# Patient Record
Sex: FEMALE | Race: WHITE | NOT HISPANIC OR LATINO | Employment: OTHER | URBAN - METROPOLITAN AREA
[De-identification: names, ages, dates, MRNs, and addresses within clinical notes are randomized per-mention and may not be internally consistent; named-entity substitution may affect disease eponyms.]

---

## 2017-01-03 ENCOUNTER — TELEPHONE (OUTPATIENT)
Dept: ENDOCRINOLOGY | Age: 77
End: 2017-01-03

## 2017-01-03 NOTE — TELEPHONE ENCOUNTER
----- Message from Angie Prajapati sent at 1/3/2017 12:32 PM EST -----  Contact: CARSON WITH [facility] PHYSICAL THERAPY PHONE# [phone]  CARSON WANTS TO INFORM DR. HUTCHISON THAT HE HASN'T BEEN ABLE TO SCHEDULE AN INITIAL EVALUATION FOR PHYSICAL THERAPY WITH THIS PATIENT. HE HAS BEEN CALLING FOR OVER 1 WEEK AND SHE WON'T ANSWER OR RETURN HIS CALLS. HE HAS SPOKEN WITH THE IN HOME NURSE WHO HAS BEEN IN CONTACT WITH THE PATIENT AND SHE SAYS THAT PATIENT ISN'T WANTING THERAPY.

## 2017-01-05 ENCOUNTER — OFFICE VISIT (OUTPATIENT)
Dept: ENDOCRINOLOGY | Age: 77
End: 2017-01-05

## 2017-01-05 VITALS
DIASTOLIC BLOOD PRESSURE: 54 MMHG | SYSTOLIC BLOOD PRESSURE: 142 MMHG | OXYGEN SATURATION: 96 % | WEIGHT: 182 LBS | HEIGHT: 64 IN | HEART RATE: 55 BPM | BODY MASS INDEX: 31.07 KG/M2

## 2017-01-05 DIAGNOSIS — E78.5 HYPERLIPIDEMIA, UNSPECIFIED HYPERLIPIDEMIA TYPE: ICD-10-CM

## 2017-01-05 DIAGNOSIS — E55.9 VITAMIN D DEFICIENCY: ICD-10-CM

## 2017-01-05 DIAGNOSIS — R82.71 BACTERIURIA: ICD-10-CM

## 2017-01-05 DIAGNOSIS — I10 ESSENTIAL HYPERTENSION: Primary | ICD-10-CM

## 2017-01-05 DIAGNOSIS — E11.319 TYPE 2 DIABETES MELLITUS WITH RETINOPATHY, MACULAR EDEMA PRESENCE UNSPECIFIED, UNSPECIFIED LATERALITY, UNSPECIFIED LONG TERM INSULIN USE STATUS, UNSPECIFIED RETINOPATHY SEVERITY: ICD-10-CM

## 2017-01-05 PROCEDURE — 99214 OFFICE O/P EST MOD 30 MIN: CPT | Performed by: INTERNAL MEDICINE

## 2017-01-05 RX ORDER — AMOXICILLIN 500 MG/1
1000 CAPSULE ORAL DAILY
COMMUNITY
End: 2017-05-25

## 2017-01-05 RX ORDER — NITROFURANTOIN 25; 75 MG/1; MG/1
CAPSULE ORAL
COMMUNITY
Start: 2016-11-29 | End: 2017-01-05

## 2017-01-05 RX ORDER — OXYBUTYNIN CHLORIDE 10 MG/1
TABLET, EXTENDED RELEASE ORAL
COMMUNITY
Start: 2016-11-09 | End: 2017-01-05

## 2017-01-05 NOTE — MR AVS SNAPSHOT
"                        Janice Garcia   1/5/2017 10:00 AM   Office Visit    Dept Phone:  [phone]   Encounter #:  [id number]    Provider:  Paxton Starks MD   Department:  [facility] ENDOCRINOLOGY                Your Full Care Plan              Today's Medication Changes          These changes are accurate as of: 1/5/17 12:15 PM.  If you have any questions, ask your nurse or doctor.               Stop taking medication(s)listed here:     acetaminophen 500 MG tablet   Commonly known as:  TYLENOL   Stopped by:  Paxton Starks MD           ciprofloxacin 500 MG tablet   Commonly known as:  CIPRO   Stopped by:  Paxton Starks MD           nitrofurantoin (macrocrystal-monohydrate) 100 MG capsule   Commonly known as:  MACROBID   Stopped by:  Paxton Starks MD           oxybutynin XL 10 MG 24 hr tablet   Commonly known as:  DITROPAN-XL   Stopped by:  Paxton Starks MD           oxybutynin XL 5 MG 24 hr tablet   Commonly known as:  DITROPAN-XL   Stopped by:  Paxton Starks MD                      Your Updated Medication List          This list is accurate as of: 1/5/17 12:15 PM.  Always use your most recent med list.                ALIGN 4 MG capsule       amoxicillin 500 MG capsule   Commonly known as:  AMOXIL       aspirin 325 MG tablet       clonazePAM 0.5 MG tablet   Commonly known as:  KlonoPIN       Cranberry 400 MG capsule       glucose blood test strip   Commonly known as:  CONRADO CONTOUR NEXT TEST   TESTING BS 4 TIMES DAILY       insulin  UNIT/ML injection   Commonly known as:  NOVOLIN N   50 units at bedtime       insulin regular 100 UNIT/ML injection   Commonly known as:  humuLIN R,novoLIN R   10 units at breakfast, 10 units at lunch, and 15 units at supper       Insulin Syringe-Needle U-100 31G X 5/16\" 0.5 ML misc       lisinopril 5 MG tablet   Commonly known as:  PRINIVIL,ZESTRIL   Take 1 tablet by mouth daily.       metoprolol tartrate 25 MG tablet   Commonly " known as:  LOPRESSOR   Take 1 tablet (25 mg total) by mouth 2 (two) times a day.       nitroglycerin 0.4 MG SL tablet   Commonly known as:  NITROSTAT       sodium chloride 0.65 % nasal spray   Commonly known as:  OCEAN       venlafaxine  MG 24 hr capsule   Commonly known as:  EFFEXOR-XR       Vitamin D3 2000 UNITS capsule       vitamin E 400 UNIT capsule               We Performed the Following     Microalbumin / Creatinine Urine Ratio     Urinalysis With / Microscopic If Indicated     Urine Culture       You Were Diagnosed With        Codes Comments    Essential hypertension    -  Primary ICD-10-CM: I10  ICD-9-CM: 401.9     Type 2 diabetes mellitus with retinopathy, macular edema presence unspecified, unspecified laterality, unspecified long term insulin use status, unspecified retinopathy severity     ICD-10-CM: E11.319  ICD-9-CM: 250.50, 362.01     Hyperlipidemia, unspecified hyperlipidemia type     ICD-10-CM: E78.5  ICD-9-CM: 272.4     Vitamin D deficiency     ICD-10-CM: E55.9  ICD-9-CM: 268.9     Bacteriuria     ICD-10-CM: R82.71  ICD-9-CM: 791.9       Instructions     None    Patient Instructions History      Upcoming Appointments     Visit Type Date Time Department    OFFICE VISIT 2017 10:00 AM [facility]    OFFICE VISIT 2017  1:30 PM [facility] TANVISOLOMONE SALOME      FlxOne Signup     [facility] FlxOne allows you to send messages to your doctor, view your test results, renew your prescriptions, schedule appointments, and more. To sign up, go to StoredIQ and click on the Sign Up Now link in the New User? box. Enter your FlxOne Activation Code exactly as it appears below along with the last four digits of your Social Security Number and your Date of Birth () to complete the sign-up process. If you do not sign up before the expiration date, you must request a new code.    FlxOne Activation Code: [email or web address]  Expires: 2017 12:13 PM    If you have questions,  "you can email [email or web address] or call [phone] to talk to our MyChart staff. Remember, Pinteresthart is NOT to be used for urgent needs. For medical emergencies, dial 911.               Other Info from Your Visit           Your Appointments     May 25, 2017  1:30 PM EDT   Office Visit with Paxton Starks MD   [facility] ENDOCRINOLOGY (--)    [address] [address] [phone]           Arrive 15 minutes prior to appointment.              Allergies     Atorvastatin      Dilaudid [Hydromorphone Hcl]  Other (See Comments)    Shallow breathing     Hydromorphone  Other (See Comments)    Altered mental status    Lovastatin      Morphine And Related      Penicillins      Pravastatin      Sulfa Antibiotics      Zocor  [Simvastatin]        Reason for Visit     Diabetes     Hyperlipidemia     Vitamin D Deficiency           Vital Signs     Blood Pressure Pulse Height Weight Oxygen Saturation Body Mass Index    142/54 (BP Location: Right arm, Patient Position: Sitting, Cuff Size: Large Adult) 55 63.5\" (161.3 cm) 182 lb (82.6 kg) 96% 31.73 kg/m2    Smoking Status                   Never Smoker           Problems and Diagnoses Noted     High blood pressure    High cholesterol or triglycerides    Type 2 diabetes mellitus with retinopathy    Vitamin D deficiency    Bacteria in urine            "

## 2017-01-05 NOTE — PROGRESS NOTES
Subjective   Janice Garcia is a 76 y.o. female.     HPI Comments: DM 2 NOT TESTING BS MACHINE IS BROKEN USUALLY TEST 2 X DAY / LAST DM EYE EXAM JAN 2016/ LAST DM FOOT EXAM TODAY WITH DR HUTCHISON     Diabetes   Hypoglycemia symptoms include confusion ( per daughter ), dizziness and tremors. Associated symptoms include fatigue.   Hyperlipidemia   Associated symptoms include shortness of breath.      Patient is a 76-year-old female came in for follow-up. She was admitted at [facility] 2016 for UTI and sepsis and has completed antibiotics therapy. She has intermittent incontinence. She denies any dysuria or fever.  He had a normal cystoscopy in July 2016 done by Dr. Larose.  She was admitted to the hospital in December 2016 for urinary tract infection.  She will be on chronic amoxicillin 500 mg once a day.  She was taken off oxybutynin.  She denies dysuria     She has insulin-requiring type 2 diabetes mellitus. She has been on Novolin N 50 q hs and is supposed to Humulin R 10-15-20.  She takes Novolin R 20 units at bedtime only.  She has few hypoglycemic episodes.  She has not been checking her blood sugar because she lost her meter.  Hemoglobin A1c done in December 2016 was 8.2%.  Last meal was at 10 AM.     She has retinopathy and had previous retinal laser treatment.  Her last eye examination was in May 2016.  She did not require laser treatment. She has no microalbuminuria urine sample taken last 3/16. She developed acute renal failure after the bypass surgery but did did not require hemodialysis. She was seen by Dr. Hawkins to that time. She is back on lisinopril 5 mg/day.     She has hyperlipidemia and stopped Zetia in the past because of the costs. She was intolerant of Lipitor, Mevacor, Pravachol, and Zocor in the past. She has stopped niacin. She has been off Zetia and has not started in the PCSK9 inhibitor study with Dr. Pearson      She had a sleep study in July 2016 but is supposed to use a CPAP.  She  "has not followed up with Dr. Jiang.  She does not want to use a CPAP.     She has history of depression and is on Klonopin through her psychiatrist Dr. Trevino.  Her Effexor XR dose was reduced to 75 mg once a day because of hyponatremia.     She has vitamin D deficiency and has been taking vitamin D3 1000 units regularly.    She has chronic back pain and left knee pain due to arthritis.  She had a previous right knee replacement done by Dr. Nelson.    She is overdue for a colonoscopy.  She has not followed up with Dr. Nelson.  She has intermittent constipation.  She denies any melena or hematochezia.    She is going to an assisted living facility  The following portions of the patient's history were reviewed and updated as appropriate: allergies, current medications, past family history, past medical history, past social history, past surgical history and problem list.    Review of Systems   Constitutional: Positive for chills and fatigue.   HENT: Negative.    Eyes: Negative.    Respiratory: Positive for shortness of breath.    Cardiovascular: Negative.    Gastrointestinal: Positive for abdominal distention and constipation.   Endocrine: Negative.    Musculoskeletal: Positive for back pain.   Skin: Negative.    Allergic/Immunologic: Negative.    Neurological: Positive for dizziness and tremors.   Hematological: Bruises/bleeds easily (on 325 aspirin ).   Psychiatric/Behavioral: Positive for confusion ( per daughter ) and sleep disturbance ( sleep apnea no machine ).       Objective      Vitals:    01/05/17 1108   BP: 142/54   BP Location: Right arm   Patient Position: Sitting   Cuff Size: Large Adult   Pulse: 55   SpO2: 96%   Weight: 182 lb (82.6 kg)   Height: 63.5\" (161.3 cm)     Physical Exam   Constitutional: She is oriented to person, place, and time. She appears well-developed and well-nourished. No distress.   HENT:   Head: Normocephalic.   Nose: Nose normal.   Mouth/Throat: No oropharyngeal exudate.   Eyes: " Conjunctivae and EOM are normal. Right eye exhibits no discharge. Left eye exhibits no discharge. No scleral icterus.   Neck: Neck supple. No JVD present. No tracheal deviation present. No thyromegaly present.   Cardiovascular: Normal rate, regular rhythm, normal heart sounds and intact distal pulses.  Exam reveals no gallop and no friction rub.    No murmur heard.  Pulmonary/Chest: Effort normal and breath sounds normal. No respiratory distress. She has no wheezes. She has no rales.   Abdominal: Soft. Bowel sounds are normal. She exhibits no distension and no mass. There is no tenderness.   Musculoskeletal: Normal range of motion. She exhibits no edema, tenderness or deformity.   Lymphadenopathy:     She has no cervical adenopathy.   Neurological: She is oriented to person, place, and time. She has normal reflexes. She displays normal reflexes.   Intact light touch   Skin: Skin is warm and dry. No erythema. No pallor.   Psychiatric: She has a normal mood and affect. Her behavior is normal.     Office Visit on 03/08/2016   Component Date Value Ref Range Status   • Glucose 03/08/2016 95  65 - 99 mg/dL Final    Comment: The MDRD GFR formula is only valid for adults with stable  renal function between ages 18 and 70.     • BUN 03/08/2016 18  8 - 23 mg/dL Final   • Creatinine 03/08/2016 1.06* 0.57 - 1.00 mg/dL Final   • eGFR Non African Am 03/08/2016 51* >60 mL/min/1.73 Final   • eGFR African Am 03/08/2016 61  >60 mL/min/1.73 Final   • BUN/Creatinine Ratio 03/08/2016 17.0  7.0 - 25.0 Final   • Sodium 03/08/2016 139  136 - 145 mmol/L Final   • Potassium 03/08/2016 5.4* 3.5 - 5.2 mmol/L Final   • Chloride 03/08/2016 98* 99 - 107 mmol/L Final   • Total CO2 03/08/2016 27.3  22.0 - 29.0 mmol/L Final   • Calcium 03/08/2016 10.3  8.6 - 10.5 mg/dL Final   • Total Protein 03/08/2016 7.3  6.0 - 8.5 g/dL Final   • Albumin 03/08/2016 4.20  3.50 - 5.20 g/dL Final   • Globulin 03/08/2016 3.1  gm/dL Final   • A/G Ratio 03/08/2016 1.4   g/dL Final   • Total Bilirubin 03/08/2016 0.2  0.1 - 1.2 mg/dL Final   • Alkaline Phosphatase 03/08/2016 82  39 - 117 U/L Final   • AST (SGOT) 03/08/2016 23  1 - 32 U/L Final   • ALT (SGPT) 03/08/2016 19  1 - 33 U/L Final   • Total Cholesterol 03/08/2016 179  0 - 200 mg/dL Final   • Triglycerides 03/08/2016 79  0 - 150 mg/dL Final   • HDL Cholesterol 03/08/2016 49  40 - 60 mg/dL Final   • VLDL Cholesterol 03/08/2016 15.8  5 - 40 mg/dL Final   • LDL Cholesterol  03/08/2016 114  mg/dL Final   • Hemoglobin A1C 03/08/2016 7.5* 4.8 - 5.6 % Final    Comment: Hemoglobin A1C Ranges:  Increased Risk for Diabetes  5.7% to 6.4%  Diabetes                     >= 6.5%  Diabetic Goal                < 7.0%     • TSH 03/08/2016 2.700  0.270 - 4.200 mIU/mL Final   • Free T4 03/08/2016 1.25  0.93 - 1.70 ng/dL Final   • Creatinine, Urine 03/08/2016 84.4  15.0 - 278.0 mg/dL Final   • Microalbumin, Urine 03/08/2016 24.5* 0.0 - 17.0 ug/mL Final   • Microalbumin/Creatinine Ratio Urine 03/08/2016 29.0  0.0 - 30.0 mg/g creat Final   • Specific Gravity, UA 03/08/2016 1.016  1.005 - 1.030 Final   • pH, UA 03/08/2016 6.5  5.0 - 8.0 Final   • Color, UA 03/08/2016 Yellow   Final    REFERENCE RANGE: Yellow, Straw   • Appearance, UA 03/08/2016 Clear  Clear Final   • Leukocytes, UA 03/08/2016 See below:* Negative Final    Small (1+)   • Protein 03/08/2016 Comment  Negative Final    Negative   • Glucose, UA 03/08/2016 Comment  Negative Final    Negative   • Ketones 03/08/2016 Comment  Negative Final    Negative   • Blood, UA 03/08/2016 Comment  Negative Final    Negative   • Bilirubin, UA 03/08/2016 Comment  Negative Final    Negative   • Urobilinogen, UA 03/08/2016 Comment   Final    Comment: 0.2 E.U./dL  REFERENCE RANGE: 0.2 E.U./dL, 1.0 E.U./dL     • Nitrite, UA 03/08/2016 Comment  Negative Final    Negative   • WBC 03/08/2016 3-5* None Seen /hpf Final   • RBC, UA 03/08/2016 0-2* None Seen /hpf Final   • Epithelial Cells (non renal) 03/08/2016  3-6* /hpf Final    REFERENCE RANGE: None Seen, 0-2   • Cast Type 03/08/2016 Comment   Final    HYALINE CASTS, UA            0-2              /LPF       None Seen  N   • Bacteria, UA 03/08/2016 Comment  None Seen /hpf Final    None Seen     Assessment/Plan   Janice was seen today for diabetes, hyperlipidemia and vitamin d deficiency.    Diagnoses and all orders for this visit:    Essential hypertension    Type 2 diabetes mellitus with retinopathy, macular edema presence unspecified, unspecified laterality, unspecified long term insulin use status, unspecified retinopathy severity    Hyperlipidemia, unspecified hyperlipidemia type    Vitamin D deficiency      Continue Novolin N 50 units at bedtime.  Take Novolin R regularly with meals.  Check blood sugars regularly.  Follow-up with Dr. Pearson for possible inclusion in lipid study.  Follow-up with Dr. Jiang.  Continue vitamin D supplements  Follow-up with Dr. Nelson for possible colonoscopy.  Follow with Dr. Larose as scheduled.    Send copy of my note and labs to Dr. Ramsey Nelson, Dr. Jiang, Dr. Larose, and Dr. Joe Pearson.    RTC 4 mos.

## 2017-01-06 LAB — UNABLE TO VOID: NORMAL

## 2017-01-09 ENCOUNTER — TELEPHONE (OUTPATIENT)
Dept: ENDOCRINOLOGY | Age: 77
End: 2017-01-09

## 2017-01-09 NOTE — TELEPHONE ENCOUNTER
----- Message from Arabella Rodriguez sent at 1/9/2017  1:15 PM EST -----  Contact: [facility] needs a medication list for patient sent to Fax number [phone] Hollie Roth

## 2017-01-12 ENCOUNTER — TELEPHONE (OUTPATIENT)
Dept: ENDOCRINOLOGY | Age: 77
End: 2017-01-12

## 2017-01-12 NOTE — TELEPHONE ENCOUNTER
----- Message from Angie Prajapati sent at 1/12/2017  9:08 AM EST -----  Contact: NESHA OT WITH [facility] [phone]  NESHA SAYS THAT THIS PATIENT IS REFUSING OT AND SHE WOULD LIKE DR. HUTCHISON TO KNOW THIS.

## 2017-01-18 RX ORDER — LISINOPRIL 5 MG/1
TABLET ORAL
Qty: 30 TABLET | Refills: 5 | Status: SHIPPED | OUTPATIENT
Start: 2017-01-18 | End: 2017-08-16 | Stop reason: SDUPTHER

## 2017-02-08 ENCOUNTER — TELEPHONE (OUTPATIENT)
Dept: ENDOCRINOLOGY | Age: 77
End: 2017-02-08

## 2017-02-08 DIAGNOSIS — E78.49 OTHER HYPERLIPIDEMIA: ICD-10-CM

## 2017-02-08 DIAGNOSIS — E55.9 VITAMIN D DEFICIENCY: ICD-10-CM

## 2017-02-08 DIAGNOSIS — I10 ESSENTIAL HYPERTENSION: ICD-10-CM

## 2017-02-08 DIAGNOSIS — G47.39 OTHER SLEEP APNEA: ICD-10-CM

## 2017-02-08 DIAGNOSIS — I25.10 CAD IN NATIVE ARTERY: ICD-10-CM

## 2017-02-08 RX ORDER — BLOOD SUGAR DIAGNOSTIC
STRIP MISCELLANEOUS
Qty: 200 EACH | Refills: 5 | Status: SHIPPED | OUTPATIENT
Start: 2017-02-08

## 2017-02-08 NOTE — TELEPHONE ENCOUNTER
----- Message from Michelle Patrick sent at 2/8/2017 11:04 AM EST -----  Contact: PATIENT  PATIENT REQUESTED SCRIPT  FOR BD INSULIN SYRINGES, 100CC. 5XDAY,  -[id number], REQUESTED 1 BOX.   SAID ITS BEEN OVER A YEAR SINCE SHE HAD A REFILL.   SHE SAID SHE TAKES R, 4XDAY, AND N 1XNIGHT.

## 2017-02-16 ENCOUNTER — TELEPHONE (OUTPATIENT)
Dept: ENDOCRINOLOGY | Age: 77
End: 2017-02-16

## 2017-02-17 RX ORDER — INSULIN HUMAN 100 [IU]/ML
INJECTION, SUSPENSION SUBCUTANEOUS
Qty: 30 ML | Refills: 1 | Status: SHIPPED | OUTPATIENT
Start: 2017-02-17 | End: 2017-05-25 | Stop reason: ALTCHOICE

## 2017-04-21 NOTE — TELEPHONE ENCOUNTER
----- Message from Angie Qiu sent at 4/21/2017  2:36 PM EDT -----  Contact: PHARMACY  THE PATIENT IS ASKING FOR A REFILL OF CONRADO CONTOUR NEXT TEST STRIPS. SHE TESTS 4X DAILY AND GETS 200 AT A TIME. SHE IS TOTALLY OUT AND HAS INFORMED HER PHARMACY THAT THIS IS URGENT THAT SHE GET THESE. PLEASE SEND A SCRIPT TO:  [facility]   [phone] (Phone)  [phone] (Fax)

## 2017-05-25 ENCOUNTER — OFFICE VISIT (OUTPATIENT)
Dept: ENDOCRINOLOGY | Age: 77
End: 2017-05-25

## 2017-05-25 VITALS
HEART RATE: 63 BPM | SYSTOLIC BLOOD PRESSURE: 136 MMHG | WEIGHT: 185 LBS | BODY MASS INDEX: 31.58 KG/M2 | DIASTOLIC BLOOD PRESSURE: 62 MMHG | HEIGHT: 64 IN | OXYGEN SATURATION: 97 %

## 2017-05-25 DIAGNOSIS — I10 ESSENTIAL HYPERTENSION: ICD-10-CM

## 2017-05-25 DIAGNOSIS — E11.8 TYPE 2 DIABETES MELLITUS WITH COMPLICATION, WITH LONG-TERM CURRENT USE OF INSULIN (HCC): ICD-10-CM

## 2017-05-25 DIAGNOSIS — Z79.4 TYPE 2 DIABETES MELLITUS WITH COMPLICATION, WITH LONG-TERM CURRENT USE OF INSULIN (HCC): ICD-10-CM

## 2017-05-25 DIAGNOSIS — R82.90 ABNORMAL FINDING IN URINE: ICD-10-CM

## 2017-05-25 DIAGNOSIS — E55.9 VITAMIN D DEFICIENCY: ICD-10-CM

## 2017-05-25 DIAGNOSIS — E78.5 HYPERLIPIDEMIA, UNSPECIFIED HYPERLIPIDEMIA TYPE: ICD-10-CM

## 2017-05-25 DIAGNOSIS — I25.10 CAD IN NATIVE ARTERY: ICD-10-CM

## 2017-05-25 DIAGNOSIS — Z87.440 HISTORY OF RECURRENT UTIS: ICD-10-CM

## 2017-05-25 DIAGNOSIS — E11.319 TYPE 2 DIABETES MELLITUS WITH RETINOPATHY, MACULAR EDEMA PRESENCE UNSPECIFIED, UNSPECIFIED LATERALITY, UNSPECIFIED LONG TERM INSULIN USE STATUS, UNSPECIFIED RETINOPATHY SEVERITY: Primary | ICD-10-CM

## 2017-05-25 PROCEDURE — 99214 OFFICE O/P EST MOD 30 MIN: CPT | Performed by: INTERNAL MEDICINE

## 2017-05-25 RX ORDER — MULTIVIT-MIN/IRON/FOLIC ACID/K 18-600-40
1 CAPSULE ORAL
COMMUNITY
End: 2018-03-20

## 2017-05-30 LAB
ALBUMIN SERPL-MCNC: 4.3 G/DL (ref 3.5–5.2)
ALBUMIN/CREAT UR: 11.4 MG/G CREAT (ref 0–30)
ALBUMIN/GLOB SERPL: 1.3 G/DL
ALP SERPL-CCNC: 103 U/L (ref 39–117)
ALT SERPL-CCNC: 20 U/L (ref 1–33)
APPEARANCE UR: (no result)
AST SERPL-CCNC: 27 U/L (ref 1–32)
BACTERIA #/AREA URNS HPF: ABNORMAL /HPF
BACTERIA UR CULT: ABNORMAL
BACTERIA UR CULT: ABNORMAL
BILIRUB SERPL-MCNC: 0.4 MG/DL (ref 0.1–1.2)
BILIRUB UR QL STRIP: NEGATIVE
BUN SERPL-MCNC: 16 MG/DL (ref 8–23)
BUN/CREAT SERPL: 15 (ref 7–25)
CALCIUM SERPL-MCNC: 10.6 MG/DL (ref 8.6–10.5)
CASTS URNS MICRO: ABNORMAL
CHLORIDE SERPL-SCNC: 100 MMOL/L (ref 98–107)
CHOLEST SERPL-MCNC: 215 MG/DL (ref 0–200)
CO2 SERPL-SCNC: 24.8 MMOL/L (ref 22–29)
COLOR UR: YELLOW
CREAT SERPL-MCNC: 1.07 MG/DL (ref 0.57–1)
CREAT UR-MCNC: 102.5 MG/DL
EPI CELLS #/AREA URNS HPF: ABNORMAL /HPF
GLOBULIN SER CALC-MCNC: 3.3 GM/DL
GLUCOSE SERPL-MCNC: 93 MG/DL (ref 65–99)
GLUCOSE UR QL: NEGATIVE
HBA1C MFR BLD: 7.32 % (ref 4.8–5.6)
HDLC SERPL-MCNC: 56 MG/DL (ref 40–60)
HGB UR QL STRIP: (no result)
KETONES UR QL STRIP: NEGATIVE
LDLC SERPL CALC-MCNC: 141 MG/DL (ref 0–100)
LEUKOCYTE ESTERASE UR QL STRIP: (no result)
MICROALBUMIN UR-MCNC: 11.7 UG/ML
NITRITE UR QL STRIP: POSITIVE
OTHER ANTIBIOTIC SUSC ISLT: ABNORMAL
PH UR STRIP: 6 [PH] (ref 5–8)
POTASSIUM SERPL-SCNC: 4.7 MMOL/L (ref 3.5–5.2)
PROT SERPL-MCNC: 7.6 G/DL (ref 6–8.5)
PROT UR QL STRIP: NEGATIVE
RBC #/AREA URNS HPF: ABNORMAL /HPF
SODIUM SERPL-SCNC: 139 MMOL/L (ref 136–145)
SP GR UR: 1.02 (ref 1–1.03)
T4 FREE SERPL-MCNC: 1.22 NG/DL (ref 0.93–1.7)
TRIGL SERPL-MCNC: 88 MG/DL (ref 0–150)
TSH SERPL DL<=0.005 MIU/L-ACNC: 2.2 MIU/ML (ref 0.27–4.2)
UROBILINOGEN UR STRIP-MCNC: (no result) MG/DL
VLDLC SERPL CALC-MCNC: 17.6 MG/DL (ref 5–40)
WBC #/AREA URNS HPF: ABNORMAL /HPF

## 2017-05-30 RX ORDER — NITROFURANTOIN 25; 75 MG/1; MG/1
100 CAPSULE ORAL 2 TIMES DAILY
Qty: 10 CAPSULE | Refills: 0 | Status: SHIPPED | OUTPATIENT
Start: 2017-05-30 | End: 2018-03-20

## 2017-06-13 ENCOUNTER — OFFICE (OUTPATIENT)
Dept: URBAN - METROPOLITAN AREA CLINIC 75 | Facility: CLINIC | Age: 77
End: 2017-06-13

## 2017-06-13 VITALS
HEIGHT: 62 IN | HEART RATE: 68 BPM | WEIGHT: 185 LBS | SYSTOLIC BLOOD PRESSURE: 136 MMHG | RESPIRATION RATE: 18 BRPM | DIASTOLIC BLOOD PRESSURE: 81 MMHG

## 2017-06-13 DIAGNOSIS — Z80.9 FAMILY HISTORY OF MALIGNANT NEOPLASM, UNSPECIFIED: ICD-10-CM

## 2017-06-13 DIAGNOSIS — Z12.11 ENCOUNTER FOR SCREENING FOR MALIGNANT NEOPLASM OF COLON: ICD-10-CM

## 2017-06-13 PROCEDURE — 99204 OFFICE O/P NEW MOD 45 MIN: CPT

## 2017-07-13 ENCOUNTER — ON CAMPUS - OUTPATIENT (OUTPATIENT)
Dept: URBAN - METROPOLITAN AREA HOSPITAL 108 | Facility: HOSPITAL | Age: 77
End: 2017-07-13

## 2017-07-13 DIAGNOSIS — Z12.11 ENCOUNTER FOR SCREENING FOR MALIGNANT NEOPLASM OF COLON: ICD-10-CM

## 2017-07-13 DIAGNOSIS — D12.0 BENIGN NEOPLASM OF CECUM: ICD-10-CM

## 2017-07-13 DIAGNOSIS — Z80.0 FAMILY HISTORY OF MALIGNANT NEOPLASM OF DIGESTIVE ORGANS: ICD-10-CM

## 2017-07-13 DIAGNOSIS — K57.30 DIVERTICULOSIS OF LARGE INTESTINE WITHOUT PERFORATION OR ABS: ICD-10-CM

## 2017-07-13 DIAGNOSIS — K64.8 OTHER HEMORRHOIDS: ICD-10-CM

## 2017-07-13 DIAGNOSIS — D12.2 BENIGN NEOPLASM OF ASCENDING COLON: ICD-10-CM

## 2017-07-13 DIAGNOSIS — K63.5 POLYP OF COLON: ICD-10-CM

## 2017-07-13 PROCEDURE — 45380 COLONOSCOPY AND BIOPSY: CPT | Mod: 59

## 2017-07-13 PROCEDURE — 45385 COLONOSCOPY W/LESION REMOVAL: CPT

## 2017-08-16 RX ORDER — LISINOPRIL 5 MG/1
TABLET ORAL
Qty: 30 TABLET | Refills: 2 | Status: SHIPPED | OUTPATIENT
Start: 2017-08-16 | End: 2018-01-02 | Stop reason: SDUPTHER

## 2017-10-18 ENCOUNTER — TELEPHONE (OUTPATIENT)
Dept: ENDOCRINOLOGY | Age: 77
End: 2017-10-18

## 2017-10-18 NOTE — TELEPHONE ENCOUNTER
----- Message from Kimberlee Rolandoin sent at 10/17/2017  1:16 PM EDT -----  Contact: PT  I LET PT KNOW OF WHAT YOU ASKED ME TO TELL HER, SHE DID NOT WANT TO HEAR ANYTHING I HAD TO SAY IN REGARDS TO HER CALL.   PT HAS REQUESTED FOR YOU TO CALL AND SPEAK W/ HER ABOUT THE SITUATION.    [phone]

## 2018-01-03 RX ORDER — LISINOPRIL 5 MG/1
TABLET ORAL
Qty: 30 TABLET | Refills: 0 | Status: SHIPPED | OUTPATIENT
Start: 2018-01-03 | End: 2018-03-05 | Stop reason: SDUPTHER

## 2018-03-05 RX ORDER — LISINOPRIL 5 MG/1
TABLET ORAL
Qty: 30 TABLET | Refills: 2 | Status: SHIPPED | OUTPATIENT
Start: 2018-03-05 | End: 2018-06-28 | Stop reason: SDUPTHER

## 2018-03-20 ENCOUNTER — OFFICE VISIT (OUTPATIENT)
Dept: ENDOCRINOLOGY | Age: 78
End: 2018-03-20

## 2018-03-20 VITALS
WEIGHT: 185.8 LBS | SYSTOLIC BLOOD PRESSURE: 134 MMHG | HEART RATE: 66 BPM | BODY MASS INDEX: 31.72 KG/M2 | OXYGEN SATURATION: 98 % | DIASTOLIC BLOOD PRESSURE: 60 MMHG | HEIGHT: 64 IN

## 2018-03-20 DIAGNOSIS — E78.5 HYPERLIPIDEMIA, UNSPECIFIED HYPERLIPIDEMIA TYPE: ICD-10-CM

## 2018-03-20 DIAGNOSIS — E55.9 VITAMIN D DEFICIENCY: ICD-10-CM

## 2018-03-20 DIAGNOSIS — Z79.4 TYPE 2 DIABETES MELLITUS WITH COMPLICATION, WITH LONG-TERM CURRENT USE OF INSULIN (HCC): Primary | ICD-10-CM

## 2018-03-20 DIAGNOSIS — R82.90 ABNORMAL FINDING IN URINE: ICD-10-CM

## 2018-03-20 DIAGNOSIS — E11.319 TYPE 2 DIABETES MELLITUS WITH RETINOPATHY, MACULAR EDEMA PRESENCE UNSPECIFIED, UNSPECIFIED LATERALITY, UNSPECIFIED LONG TERM INSULIN USE STATUS, UNSPECIFIED RETINOPATHY SEVERITY: ICD-10-CM

## 2018-03-20 DIAGNOSIS — I25.10 CAD IN NATIVE ARTERY: ICD-10-CM

## 2018-03-20 DIAGNOSIS — I10 ESSENTIAL HYPERTENSION: ICD-10-CM

## 2018-03-20 DIAGNOSIS — E11.8 TYPE 2 DIABETES MELLITUS WITH COMPLICATION, WITH LONG-TERM CURRENT USE OF INSULIN (HCC): Primary | ICD-10-CM

## 2018-03-20 PROCEDURE — 99214 OFFICE O/P EST MOD 30 MIN: CPT | Performed by: INTERNAL MEDICINE

## 2018-03-20 RX ORDER — NITROGLYCERIN 0.4 MG/1
1 TABLET SUBLINGUAL
COMMUNITY
Start: 2014-12-02

## 2018-03-20 RX ORDER — VENLAFAXINE HYDROCHLORIDE 150 MG/1
1 CAPSULE, EXTENDED RELEASE ORAL
COMMUNITY
Start: 2013-07-31 | End: 2018-03-20 | Stop reason: SDUPTHER

## 2018-03-20 RX ORDER — ASPIRIN 325 MG
1 TABLET ORAL
COMMUNITY
End: 2018-03-20 | Stop reason: SDUPTHER

## 2018-03-20 RX ORDER — LISINOPRIL 5 MG/1
1 TABLET ORAL
COMMUNITY
Start: 2018-01-03 | End: 2018-03-20 | Stop reason: SDUPTHER

## 2018-03-20 NOTE — PROGRESS NOTES
Subjective   Janice Garcia is a 77 y.o. female.     F/u for dm 2,hyperlipidemia, hypertension, vitamin d def , depression / testing bs 2 x day last dm eye exam 5/31/16 with dr Magallanes/ last dm foot exam today with dr Starks       Diabetes   Hypoglycemia symptoms include nervousness/anxiousness. Associated symptoms include chest pain.   Hyperlipidemia   Associated symptoms include chest pain and shortness of breath.   Hypertension   Associated symptoms include chest pain and shortness of breath.      Patient is a 77 year old patient who came in for follow-up.    She has insulin-requiring type 2 diabetes mellitus. She has been on Novolin N 50 q hs and is supposed to Novolin R 20 units every AM and 20 units at bedtime. She is not taking Novolin R regularly. She has few hypoglycemic episodes.  She has gained 3 pounds since January 2017.  FBS .  -427.   Last meal was last night.      She has retinopathy and had previous retinal laser treatment.  Her last eye examination was in 1/17. She did not require laser treatment. She has no microalbuminuria on urine sample taken last 5/17. She developed acute renal failure after the bypass surgery but did did not require hemodialysis. She was seen by Dr. Hawkins to that time. She is on lisinopril 5 mg/day and Lasix 40 mg/day.      She has hyperlipidemia and stopped Zetia in the past because of the costs. She was intolerant of Lipitor, Mevacor, Pravachol, and Zocor in the past. She has stopped niacin. She has been off Zetia and has not started in the PCSK9 inhibitor study with Dr. Pearson.  She had a negative tilt table test.      She had a sleep study in July 2016 but is supposed to use a CPAP. She has not followed up with Dr. Jiang. She does not want to use a CPAP.      She has history of depression and is on Klonopin 1 mg BID and Effexor XR 75 mg/day through her psychiatrist Dr. Trevino.       She has vitamin D deficiency and has  been taking vitamin D3 1000 units  "regularly.    She has intermittent urinary incontinence and has been seen by Dr. Larose.  She denies dysuria.        The following portions of the patient's history were reviewed and updated as appropriate: allergies, current medications, past family history, past medical history, past social history, past surgical history and problem list.    Review of Systems   Constitutional: Negative.    HENT: Negative.    Eyes: Negative.    Respiratory: Positive for shortness of breath.    Cardiovascular: Positive for chest pain and leg swelling.   Gastrointestinal: Positive for abdominal distention (bloating ) and constipation.   Endocrine: Negative.    Genitourinary: Negative.    Musculoskeletal: Negative.    Skin: Negative.    Allergic/Immunologic: Negative.    Neurological: Negative.    Hematological: Negative.    Psychiatric/Behavioral: Positive for agitation. The patient is nervous/anxious.        Objective      Vitals:    03/20/18 1422   BP: 134/60   BP Location: Right arm   Patient Position: Sitting   Cuff Size: Large Adult   Pulse: 66   SpO2: 98%   Weight: 84.3 kg (185 lb 12.8 oz)   Height: 161.3 cm (63.5\")     Physical Exam   Constitutional: She is oriented to person, place, and time. She appears well-developed and well-nourished. No distress.   HENT:   Head: Normocephalic.   Nose: Nose normal.   Mouth/Throat: No oropharyngeal exudate.   Eyes: Conjunctivae and EOM are normal. Right eye exhibits no discharge. Left eye exhibits no discharge. No scleral icterus.   Neck: Neck supple. No JVD present. No tracheal deviation present. No thyromegaly present.   Cardiovascular: Normal rate, regular rhythm, normal heart sounds and intact distal pulses.  Exam reveals no friction rub.    No murmur heard.  Pulmonary/Chest: Effort normal and breath sounds normal. No respiratory distress. She has no wheezes. She has no rales.   Abdominal: Soft. Bowel sounds are normal. She exhibits no distension and no mass. There is no tenderness. " There is no guarding.   Musculoskeletal: Normal range of motion. She exhibits edema (+1 edema on right foot).   Lymphadenopathy:     She has no cervical adenopathy.   Neurological: She is alert and oriented to person, place, and time.   Intact light touch on lower extremities   Skin: Skin is warm and dry.   Psychiatric: She has a normal mood and affect. Her behavior is normal.     Office Visit on 05/25/2017   Component Date Value Ref Range Status   • Creatinine, Urine 05/30/2017 102.5  Not Estab. mg/dL Final   • Microalbumin, Urine 05/30/2017 11.7  Not Estab. ug/mL Final   • Microalbumin/Creatinine Ratio Urine 05/30/2017 11.4  0.0 - 30.0 mg/g creat Final   • Glucose 05/30/2017 93  65 - 99 mg/dL Final   • BUN 05/30/2017 16  8 - 23 mg/dL Final   • Creatinine 05/30/2017 1.07* 0.57 - 1.00 mg/dL Final   • eGFR Non African Am 05/30/2017 50* >60 mL/min/1.73 Final    Comment: The MDRD GFR formula is only valid for adults with stable  renal function between ages 18 and 70.     • eGFR  Am 05/30/2017 60* >60 mL/min/1.73 Final   • BUN/Creatinine Ratio 05/30/2017 15.0  7.0 - 25.0 Final   • Sodium 05/30/2017 139  136 - 145 mmol/L Final   • Potassium 05/30/2017 4.7  3.5 - 5.2 mmol/L Final   • Chloride 05/30/2017 100  98 - 107 mmol/L Final   • Total CO2 05/30/2017 24.8  22.0 - 29.0 mmol/L Final   • Calcium 05/30/2017 10.6* 8.6 - 10.5 mg/dL Final   • Total Protein 05/30/2017 7.6  6.0 - 8.5 g/dL Final   • Albumin 05/30/2017 4.30  3.50 - 5.20 g/dL Final   • Globulin 05/30/2017 3.3  gm/dL Final   • A/G Ratio 05/30/2017 1.3  g/dL Final   • Total Bilirubin 05/30/2017 0.4  0.1 - 1.2 mg/dL Final   • Alkaline Phosphatase 05/30/2017 103  39 - 117 U/L Final   • AST (SGOT) 05/30/2017 27  1 - 32 U/L Final   • ALT (SGPT) 05/30/2017 20  1 - 33 U/L Final   • Total Cholesterol 05/30/2017 215* 0 - 200 mg/dL Final   • Triglycerides 05/30/2017 88  0 - 150 mg/dL Final   • HDL Cholesterol 05/30/2017 56  40 - 60 mg/dL Final   • VLDL Cholesterol  05/30/2017 17.6  5 - 40 mg/dL Final   • LDL Cholesterol  05/30/2017 141* 0 - 100 mg/dL Final   • Hemoglobin A1C 05/30/2017 7.32* 4.80 - 5.60 % Final    Comment: Hemoglobin A1C Ranges:  Increased Risk for Diabetes  5.7% to 6.4%  Diabetes                     >= 6.5%  Diabetic Goal                < 7.0%     • TSH 05/30/2017 2.200  0.270 - 4.200 mIU/mL Final   • Free T4 05/30/2017 1.22  0.93 - 1.70 ng/dL Final   • Specific Gravity, UA 05/30/2017 1.018  1.005 - 1.030 Final   • pH, UA 05/30/2017 6.0  5.0 - 8.0 Final   • Color, UA 05/30/2017 Yellow   Final   • Appearance, UA 05/30/2017 Cloudy* Clear Final   • Leukocytes, UA 05/30/2017 See below:* Negative Final   • Protein 05/30/2017 Negative  Negative Final   • Glucose, UA 05/30/2017 Negative  Negative Final   • Ketones 05/30/2017 Negative  Negative Final   • Blood, UA 05/30/2017 Trace* Negative Final   • Bilirubin, UA 05/30/2017 Negative  Negative Final   • Urobilinogen, UA 05/30/2017 Comment   Final    Comment: 0.2 E.U./dL  REFERENCE RANGE: 0.2 - 1.0 E.U./dL     • Nitrite, UA 05/30/2017 Positive* Negative Final   • Urine Culture 05/30/2017 Final report*  Final   • Result 1 05/30/2017 Escherichia coli*  Final   • Susceptibility Testing 05/30/2017 Comment   Final    Comment:       ** S = Susceptible; I = Intermediate; R = Resistant **                     P = Positive; N = Negative              MICS are expressed in micrograms per mL     Antibiotic                 RSLT#1    RSLT#2    RSLT#3    RSLT#4  Amoxicillin/Clavulanic Acid    S  Ampicillin                     R  Cefazolin                      R  Cefepime                       R  Ceftriaxone                    R  Cefuroxime                     R  Cephalothin                    R  Ciprofloxacin                  R  Ertapenem                      S  Gentamicin                     S  Imipenem                       S  Levofloxacin                   R  Nitrofurantoin                 S  Piperacillin                    R  Tetracycline                   R  Tobramycin                     S  Trimethoprim/Sulfa             R     • WBC, UA 05/30/2017 See below:* /hpf Final    Comment: Too Numerous to Count  REFERENCE RANGE: None Seen, 0-2     • RBC, UA 05/30/2017 0-2  /hpf Final   • Epithelial Cells (non renal) 05/30/2017 0-2  /hpf Final   • Cast Type 05/30/2017 Comment   Final   • Bacteria, UA 05/30/2017 4+* None Seen /hpf Final     Assessment/Plan   Janice was seen today for diabetes, hyperlipidemia, hypertension and vitamin d deficiency.    Diagnoses and all orders for this visit:    Type 2 diabetes mellitus with complication, with long-term current use of insulin  -     Comprehensive Metabolic Panel  -     Hemoglobin A1c  -     TSH  -     T4, Free  -     Microalbumin / Creatinine Urine Ratio - Urine, Clean Catch  -     Urine Culture - Urine, Urine, Clean Catch    Type 2 diabetes mellitus with retinopathy, macular edema presence unspecified, unspecified laterality, unspecified long term insulin use status, unspecified retinopathy severity  -     Comprehensive Metabolic Panel  -     Hemoglobin A1c  -     TSH  -     T4, Free  -     Microalbumin / Creatinine Urine Ratio - Urine, Clean Catch  -     Urine Culture - Urine, Urine, Clean Catch    Vitamin D deficiency  -     Comprehensive Metabolic Panel  -     Vitamin D 25 Hydroxy    CAD in native artery  -     Lipid Panel    Hyperlipidemia, unspecified hyperlipidemia type  -     Comprehensive Metabolic Panel  -     Lipid Panel  -     TSH  -     T4, Free    Essential hypertension  -     Comprehensive Metabolic Panel    Abnormal finding in urine   -     Urine Culture - Urine, Urine, Clean Catch      Continue Novolin N 50 units at bedtime.    Take Novolin R before breakfast and before supper.  Continue vitamin D 1000 units per day.  Advised to follow-up with Dr. Hawkins and Dr. Joe Pearson.  Advised to discuss with Dr. Pearson about Repatha.  Advised to follow with Dr. Jiang.    RTC 4  mos.

## 2018-03-21 LAB
25(OH)D3+25(OH)D2 SERPL-MCNC: 58 NG/ML (ref 30–100)
ALBUMIN SERPL-MCNC: 4.2 G/DL (ref 3.5–5.2)
ALBUMIN/CREAT UR: 9.6 MG/G CREAT (ref 0–30)
ALBUMIN/GLOB SERPL: 1.3 G/DL
ALP SERPL-CCNC: 104 U/L (ref 39–117)
ALT SERPL-CCNC: 13 U/L (ref 1–33)
AST SERPL-CCNC: 17 U/L (ref 1–32)
BILIRUB SERPL-MCNC: 0.3 MG/DL (ref 0.1–1.2)
BUN SERPL-MCNC: 16 MG/DL (ref 8–23)
BUN/CREAT SERPL: 15.4 (ref 7–25)
CALCIUM SERPL-MCNC: 10.3 MG/DL (ref 8.6–10.5)
CHLORIDE SERPL-SCNC: 94 MMOL/L (ref 98–107)
CHOLEST SERPL-MCNC: 218 MG/DL (ref 0–200)
CO2 SERPL-SCNC: 27.1 MMOL/L (ref 22–29)
CREAT SERPL-MCNC: 1.04 MG/DL (ref 0.57–1)
CREAT UR-MCNC: 33.4 MG/DL
GFR SERPLBLD CREATININE-BSD FMLA CKD-EPI: 51 ML/MIN/1.73
GFR SERPLBLD CREATININE-BSD FMLA CKD-EPI: 62 ML/MIN/1.73
GLOBULIN SER CALC-MCNC: 3.3 GM/DL
GLUCOSE SERPL-MCNC: 305 MG/DL (ref 65–99)
HBA1C MFR BLD: 9.39 % (ref 4.8–5.6)
HDLC SERPL-MCNC: 48 MG/DL (ref 40–60)
INTERPRETATION: NORMAL
LDLC SERPL CALC-MCNC: 143 MG/DL (ref 0–100)
Lab: NORMAL
MICROALBUMIN UR-MCNC: 3.2 UG/ML
POTASSIUM SERPL-SCNC: 4.7 MMOL/L (ref 3.5–5.2)
PROT SERPL-MCNC: 7.5 G/DL (ref 6–8.5)
SODIUM SERPL-SCNC: 135 MMOL/L (ref 136–145)
T4 FREE SERPL-MCNC: 1.18 NG/DL (ref 0.93–1.7)
TRIGL SERPL-MCNC: 136 MG/DL (ref 0–150)
TSH SERPL DL<=0.005 MIU/L-ACNC: 2.49 MIU/ML (ref 0.27–4.2)
VLDLC SERPL CALC-MCNC: 27.2 MG/DL (ref 5–40)

## 2018-04-26 ENCOUNTER — TELEPHONE (OUTPATIENT)
Dept: ENDOCRINOLOGY | Age: 78
End: 2018-04-26

## 2018-04-26 NOTE — TELEPHONE ENCOUNTER
----- Message from Robert Al sent at 4/26/2018 12:54 PM EDT -----  Contact: PATIENT  PATIENT STATED SOMEONE CALLED HER AND SAID THAT Dr. HUTCHISON WAS PUTTING HER ON RAPATHA AND IT WAS A 1000.00   A MONTH, I DIDN'T SEE THAT BUT SHE WOULD LIKE FOR YOU TO CALL HER AND LET HER KNOW WHAT IS GOING On.    BEST # [phone]

## 2018-06-28 RX ORDER — LISINOPRIL 5 MG/1
TABLET ORAL
Qty: 30 TABLET | Refills: 5 | Status: SHIPPED | OUTPATIENT
Start: 2018-06-28

## 2018-11-06 ENCOUNTER — TELEPHONE (OUTPATIENT)
Dept: ENDOCRINOLOGY | Age: 78
End: 2018-11-06

## 2018-11-06 NOTE — TELEPHONE ENCOUNTER
----- Message from Karen David sent at 11/6/2018  2:09 PM EST -----  Contact: [phone]  Pt states that they found a leison on her liver  They were sending the results to us    Patient desperately wants to talk to you

## 2019-02-13 ENCOUNTER — OUTSIDE FACILITY SERVICE (OUTPATIENT)
Dept: INTERNAL MEDICINE | Facility: CLINIC | Age: 79
End: 2019-02-13

## 2019-02-13 PROCEDURE — 99305 1ST NF CARE MODERATE MDM 35: CPT | Performed by: FAMILY MEDICINE

## 2019-02-21 ENCOUNTER — OUTSIDE FACILITY SERVICE (OUTPATIENT)
Dept: INTERNAL MEDICINE | Facility: CLINIC | Age: 79
End: 2019-02-21

## 2019-02-21 DIAGNOSIS — F41.9 ANXIETY: Primary | ICD-10-CM

## 2019-02-21 PROCEDURE — 99307 SBSQ NF CARE SF MDM 10: CPT | Performed by: NURSE PRACTITIONER

## 2019-02-21 RX ORDER — CLONAZEPAM 0.5 MG/1
0.5 TABLET ORAL 2 TIMES DAILY
Qty: 60 TABLET | Refills: 0 | Status: SHIPPED | OUTPATIENT
Start: 2019-02-21 | End: 2019-03-04 | Stop reason: SDUPTHER

## 2019-03-04 DIAGNOSIS — F41.9 ANXIETY: ICD-10-CM

## 2019-03-06 ENCOUNTER — TELEPHONE (OUTPATIENT)
Dept: ENDOCRINOLOGY | Age: 79
End: 2019-03-06

## 2019-03-06 ENCOUNTER — OUTSIDE FACILITY SERVICE (OUTPATIENT)
Dept: INTERNAL MEDICINE | Facility: CLINIC | Age: 79
End: 2019-03-06

## 2019-03-06 PROCEDURE — 99308 SBSQ NF CARE LOW MDM 20: CPT | Performed by: FAMILY MEDICINE

## 2019-03-06 RX ORDER — CLONAZEPAM 0.5 MG/1
0.5 TABLET ORAL 2 TIMES DAILY
Qty: 60 TABLET | Refills: 0 | Status: SHIPPED | OUTPATIENT
Start: 2019-03-06 | End: 2019-03-12 | Stop reason: SDUPTHER

## 2019-03-06 NOTE — TELEPHONE ENCOUNTER
3/6 called and sw pts daughter faxed ov note to dr Santiago office     ----- Message from Robert Al sent at 3/6/2019  2:09 PM EST -----  Contact: daughter   Daughter Belia, is requesting that Dr. Starks send patients medications and the prescribed amount. Patient is no longer able to live alone so she is in a nursing home at [facility]. Belia stated that nursing home is only giving patient 20 units of NOVOLIN N in the morning.   Daughter said that she had meeting with Luis Yu last night and he said that if Dr. Starks will advise how he has treated patient, Dr. Yu would review and make changes to the order. Daughter Belia said when patients sugar was over 200 the nursing facility will not give her any insulin.  Patient is on sliding scale and if over 150 facility will not give patient any more insulin.     Belia Daughter [phone]  Nursing home Phone # [phone]  Belia does not have the fax number she tried to get it and was unable to obtain.

## 2019-03-12 DIAGNOSIS — F41.9 ANXIETY: ICD-10-CM

## 2019-03-12 RX ORDER — CLONAZEPAM 0.5 MG/1
0.5 TABLET ORAL 2 TIMES DAILY
Qty: 60 TABLET | Refills: 0 | Status: SHIPPED | OUTPATIENT
Start: 2019-03-12 | End: 2019-03-21 | Stop reason: SDUPTHER

## 2019-03-15 ENCOUNTER — OUTSIDE FACILITY SERVICE (OUTPATIENT)
Dept: INTERNAL MEDICINE | Facility: CLINIC | Age: 79
End: 2019-03-15

## 2019-03-15 PROCEDURE — 99308 SBSQ NF CARE LOW MDM 20: CPT | Performed by: NURSE PRACTITIONER

## 2019-03-21 DIAGNOSIS — F41.9 ANXIETY: ICD-10-CM

## 2019-03-21 RX ORDER — CLONAZEPAM 0.5 MG/1
0.5 TABLET ORAL 2 TIMES DAILY
Qty: 60 TABLET | Refills: 0 | Status: SHIPPED | OUTPATIENT
Start: 2019-03-21 | End: 2019-04-08 | Stop reason: SDUPTHER

## 2019-04-08 DIAGNOSIS — F41.9 ANXIETY: ICD-10-CM

## 2019-04-09 RX ORDER — CLONAZEPAM 0.5 MG/1
0.5 TABLET ORAL 2 TIMES DAILY
Qty: 60 TABLET | Refills: 0 | Status: SHIPPED | OUTPATIENT
Start: 2019-04-09 | End: 2019-04-15 | Stop reason: SDUPTHER

## 2019-04-15 DIAGNOSIS — F41.9 ANXIETY: ICD-10-CM

## 2019-04-17 RX ORDER — CLONAZEPAM 0.5 MG/1
0.5 TABLET ORAL 2 TIMES DAILY
Qty: 60 TABLET | Refills: 0 | Status: SHIPPED | OUTPATIENT
Start: 2019-04-17 | End: 2019-05-28 | Stop reason: SDUPTHER

## 2019-05-01 ENCOUNTER — OUTSIDE FACILITY SERVICE (OUTPATIENT)
Dept: INTERNAL MEDICINE | Facility: CLINIC | Age: 79
End: 2019-05-01

## 2019-05-01 PROCEDURE — 99308 SBSQ NF CARE LOW MDM 20: CPT | Performed by: FAMILY MEDICINE

## 2019-05-28 DIAGNOSIS — F41.9 ANXIETY: ICD-10-CM

## 2019-05-28 RX ORDER — CLONAZEPAM 0.5 MG/1
0.5 TABLET ORAL 2 TIMES DAILY
Qty: 60 TABLET | Refills: 0 | Status: SHIPPED | OUTPATIENT
Start: 2019-05-28 | End: 2019-06-24 | Stop reason: SDUPTHER

## 2019-06-24 DIAGNOSIS — F41.9 ANXIETY: ICD-10-CM

## 2019-06-25 RX ORDER — CLONAZEPAM 0.5 MG/1
0.5 TABLET ORAL 2 TIMES DAILY
Qty: 60 TABLET | Refills: 0 | Status: SHIPPED | OUTPATIENT
Start: 2019-06-25 | End: 2019-07-02 | Stop reason: SDUPTHER

## 2019-07-02 DIAGNOSIS — F41.9 ANXIETY: ICD-10-CM

## 2019-07-02 RX ORDER — CLONAZEPAM 0.5 MG/1
0.5 TABLET ORAL 2 TIMES DAILY
Qty: 60 TABLET | Refills: 0 | Status: SHIPPED | OUTPATIENT
Start: 2019-07-02 | End: 2019-07-08 | Stop reason: SDUPTHER

## 2019-07-08 DIAGNOSIS — F41.9 ANXIETY: ICD-10-CM

## 2019-07-09 ENCOUNTER — OUTSIDE FACILITY SERVICE (OUTPATIENT)
Dept: INTERNAL MEDICINE | Facility: CLINIC | Age: 79
End: 2019-07-09

## 2019-07-09 PROCEDURE — 99309 SBSQ NF CARE MODERATE MDM 30: CPT | Performed by: NURSE PRACTITIONER

## 2019-07-10 RX ORDER — CLONAZEPAM 0.5 MG/1
0.5 TABLET ORAL 2 TIMES DAILY
Qty: 60 TABLET | Refills: 0 | Status: SHIPPED | OUTPATIENT
Start: 2019-07-10 | End: 2019-07-15 | Stop reason: SDUPTHER

## 2019-07-15 DIAGNOSIS — F41.9 ANXIETY: ICD-10-CM

## 2019-07-16 RX ORDER — CLONAZEPAM 0.5 MG/1
0.5 TABLET ORAL 2 TIMES DAILY
Qty: 60 TABLET | Refills: 0 | Status: SHIPPED | OUTPATIENT
Start: 2019-07-16 | End: 2019-08-19 | Stop reason: SDUPTHER

## 2019-08-07 ENCOUNTER — OUTSIDE FACILITY SERVICE (OUTPATIENT)
Dept: INTERNAL MEDICINE | Facility: CLINIC | Age: 79
End: 2019-08-07

## 2019-08-07 PROCEDURE — 99307 SBSQ NF CARE SF MDM 10: CPT | Performed by: FAMILY MEDICINE

## 2019-08-19 DIAGNOSIS — F41.9 ANXIETY: ICD-10-CM

## 2019-08-20 RX ORDER — CLONAZEPAM 0.5 MG/1
0.5 TABLET ORAL 2 TIMES DAILY
Qty: 60 TABLET | Refills: 0 | Status: SHIPPED | OUTPATIENT
Start: 2019-08-20 | End: 2019-09-03 | Stop reason: SDUPTHER

## 2019-09-03 DIAGNOSIS — F41.9 ANXIETY: ICD-10-CM

## 2019-09-04 RX ORDER — CLONAZEPAM 0.5 MG/1
0.5 TABLET ORAL 2 TIMES DAILY
Qty: 60 TABLET | Refills: 0 | Status: SHIPPED | OUTPATIENT
Start: 2019-09-04 | End: 2019-10-07 | Stop reason: SDUPTHER

## 2019-09-17 ENCOUNTER — OUTSIDE FACILITY SERVICE (OUTPATIENT)
Dept: INTERNAL MEDICINE | Facility: CLINIC | Age: 79
End: 2019-09-17

## 2019-09-17 PROCEDURE — 99309 SBSQ NF CARE MODERATE MDM 30: CPT | Performed by: NURSE PRACTITIONER

## 2019-09-24 ENCOUNTER — OUTSIDE FACILITY SERVICE (OUTPATIENT)
Dept: INTERNAL MEDICINE | Facility: CLINIC | Age: 79
End: 2019-09-24

## 2019-09-24 PROCEDURE — 99308 SBSQ NF CARE LOW MDM 20: CPT | Performed by: NURSE PRACTITIONER

## 2019-10-07 DIAGNOSIS — F41.9 ANXIETY: ICD-10-CM

## 2019-10-09 RX ORDER — CLONAZEPAM 0.5 MG/1
0.5 TABLET ORAL 2 TIMES DAILY
Qty: 60 TABLET | Refills: 0 | Status: SHIPPED | OUTPATIENT
Start: 2019-10-09 | End: 2019-11-12 | Stop reason: SDUPTHER

## 2019-10-15 ENCOUNTER — OUTSIDE FACILITY SERVICE (OUTPATIENT)
Dept: INTERNAL MEDICINE | Facility: CLINIC | Age: 79
End: 2019-10-15

## 2019-10-15 PROCEDURE — 99308 SBSQ NF CARE LOW MDM 20: CPT | Performed by: NURSE PRACTITIONER

## 2019-10-23 ENCOUNTER — OUTSIDE FACILITY SERVICE (OUTPATIENT)
Dept: INTERNAL MEDICINE | Facility: CLINIC | Age: 79
End: 2019-10-23

## 2019-10-23 PROCEDURE — 99307 SBSQ NF CARE SF MDM 10: CPT | Performed by: FAMILY MEDICINE

## 2019-11-12 DIAGNOSIS — F41.9 ANXIETY: ICD-10-CM

## 2019-11-12 RX ORDER — CLONAZEPAM 0.5 MG/1
0.5 TABLET ORAL 2 TIMES DAILY
Qty: 60 TABLET | Refills: 0 | Status: SHIPPED | OUTPATIENT
Start: 2019-11-12 | End: 2019-11-19 | Stop reason: SDUPTHER

## 2019-11-19 DIAGNOSIS — F41.9 ANXIETY: ICD-10-CM

## 2019-11-19 RX ORDER — CLONAZEPAM 0.5 MG/1
0.5 TABLET ORAL 2 TIMES DAILY
Qty: 60 TABLET | Refills: 0 | Status: SHIPPED | OUTPATIENT
Start: 2019-11-19

## 2020-01-18 ENCOUNTER — LAB REQUISITION (OUTPATIENT)
Dept: LAB | Facility: HOSPITAL | Age: 80
End: 2020-01-18

## 2020-01-18 DIAGNOSIS — Z00.00 ROUTINE GENERAL MEDICAL EXAMINATION AT A HEALTH CARE FACILITY: ICD-10-CM

## 2020-01-18 LAB
ALBUMIN SERPL-MCNC: 3.6 G/DL (ref 3.5–5.2)
ALBUMIN/GLOB SERPL: 1 G/DL
ALP SERPL-CCNC: 81 U/L (ref 39–117)
ALT SERPL W P-5'-P-CCNC: 23 U/L (ref 1–33)
ANION GAP SERPL CALCULATED.3IONS-SCNC: 13.8 MMOL/L (ref 5–15)
AST SERPL-CCNC: 22 U/L (ref 1–32)
BACTERIA UR QL AUTO: ABNORMAL /HPF
BILIRUB SERPL-MCNC: 0.4 MG/DL (ref 0.2–1.2)
BILIRUB UR QL STRIP: NEGATIVE
BUN BLD-MCNC: 15 MG/DL (ref 8–23)
BUN/CREAT SERPL: 14.3 (ref 7–25)
CALCIUM SPEC-SCNC: 9.4 MG/DL (ref 8.6–10.5)
CHLORIDE SERPL-SCNC: 100 MMOL/L (ref 98–107)
CLARITY UR: ABNORMAL
CO2 SERPL-SCNC: 27.2 MMOL/L (ref 22–29)
COLOR UR: YELLOW
CREAT BLD-MCNC: 1.05 MG/DL (ref 0.57–1)
DEPRECATED RDW RBC AUTO: 43.4 FL (ref 37–54)
ERYTHROCYTE [DISTWIDTH] IN BLOOD BY AUTOMATED COUNT: 13.2 % (ref 12.3–15.4)
GFR SERPL CREATININE-BSD FRML MDRD: 51 ML/MIN/1.73
GFR SERPL CREATININE-BSD FRML MDRD: 61 ML/MIN/1.73
GLOBULIN UR ELPH-MCNC: 3.5 GM/DL
GLUCOSE BLD-MCNC: 102 MG/DL (ref 65–99)
GLUCOSE UR STRIP-MCNC: NEGATIVE MG/DL
HCT VFR BLD AUTO: 37.8 % (ref 34–46.6)
HGB BLD-MCNC: 12.9 G/DL (ref 12–15.9)
HGB UR QL STRIP.AUTO: ABNORMAL
HYALINE CASTS UR QL AUTO: ABNORMAL /LPF
KETONES UR QL STRIP: NEGATIVE
LEUKOCYTE ESTERASE UR QL STRIP.AUTO: ABNORMAL
MCH RBC QN AUTO: 30.3 PG (ref 26.6–33)
MCHC RBC AUTO-ENTMCNC: 34.1 G/DL (ref 31.5–35.7)
MCV RBC AUTO: 88.7 FL (ref 79–97)
NITRITE UR QL STRIP: POSITIVE
PH UR STRIP.AUTO: <=5 [PH] (ref 5–8)
PLATELET # BLD AUTO: 219 10*3/MM3 (ref 140–450)
PMV BLD AUTO: 10.2 FL (ref 6–12)
POTASSIUM BLD-SCNC: 3.4 MMOL/L (ref 3.5–5.2)
PROT SERPL-MCNC: 7.1 G/DL (ref 6–8.5)
PROT UR QL STRIP: NEGATIVE
RBC # BLD AUTO: 4.26 10*6/MM3 (ref 3.77–5.28)
RBC # UR: ABNORMAL /HPF
REF LAB TEST METHOD: ABNORMAL
SODIUM BLD-SCNC: 141 MMOL/L (ref 136–145)
SP GR UR STRIP: 1.01 (ref 1–1.03)
SQUAMOUS #/AREA URNS HPF: ABNORMAL /HPF
UROBILINOGEN UR QL STRIP: ABNORMAL
WBC NRBC COR # BLD: 9 10*3/MM3 (ref 3.4–10.8)
WBC UR QL AUTO: ABNORMAL /HPF

## 2020-01-18 PROCEDURE — 80053 COMPREHEN METABOLIC PANEL: CPT

## 2020-01-18 PROCEDURE — 81001 URINALYSIS AUTO W/SCOPE: CPT

## 2020-01-18 PROCEDURE — 85027 COMPLETE CBC AUTOMATED: CPT

## 2020-01-25 ENCOUNTER — LAB REQUISITION (OUTPATIENT)
Dept: LAB | Facility: HOSPITAL | Age: 80
End: 2020-01-25

## 2020-01-25 DIAGNOSIS — N39.0 URINARY TRACT INFECTION, SITE NOT SPECIFIED: ICD-10-CM

## 2020-01-25 DIAGNOSIS — Z00.00 ROUTINE GENERAL MEDICAL EXAMINATION AT A HEALTH CARE FACILITY: ICD-10-CM

## 2020-01-25 LAB
BACTERIA UR QL AUTO: ABNORMAL /HPF
BILIRUB UR QL STRIP: NEGATIVE
CLARITY UR: ABNORMAL
COLOR UR: YELLOW
GLUCOSE UR STRIP-MCNC: NEGATIVE MG/DL
HGB UR QL STRIP.AUTO: ABNORMAL
HYALINE CASTS UR QL AUTO: ABNORMAL /LPF
KETONES UR QL STRIP: NEGATIVE
LEUKOCYTE ESTERASE UR QL STRIP.AUTO: ABNORMAL
NITRITE UR QL STRIP: POSITIVE
PH UR STRIP.AUTO: 6 [PH] (ref 5–8)
PROT UR QL STRIP: NEGATIVE
RBC # UR: ABNORMAL /HPF
REF LAB TEST METHOD: ABNORMAL
SP GR UR STRIP: 1.01 (ref 1–1.03)
SQUAMOUS #/AREA URNS HPF: ABNORMAL /HPF
UROBILINOGEN UR QL STRIP: ABNORMAL
WBC UR QL AUTO: ABNORMAL /HPF

## 2020-01-25 PROCEDURE — 81001 URINALYSIS AUTO W/SCOPE: CPT

## 2020-01-26 ENCOUNTER — LAB REQUISITION (OUTPATIENT)
Dept: LAB | Facility: HOSPITAL | Age: 80
End: 2020-01-26

## 2020-01-26 DIAGNOSIS — Z00.00 ROUTINE GENERAL MEDICAL EXAMINATION AT A HEALTH CARE FACILITY: ICD-10-CM

## 2020-01-26 LAB
ANION GAP SERPL CALCULATED.3IONS-SCNC: 13.8 MMOL/L (ref 5–15)
BACTERIA UR QL AUTO: ABNORMAL /HPF
BASOPHILS # BLD AUTO: 0.03 10*3/MM3 (ref 0–0.2)
BASOPHILS NFR BLD AUTO: 0.3 % (ref 0–1.5)
BILIRUB UR QL STRIP: NEGATIVE
BUN BLD-MCNC: 14 MG/DL (ref 8–23)
BUN/CREAT SERPL: 13.9 (ref 7–25)
CALCIUM SPEC-SCNC: 9.4 MG/DL (ref 8.6–10.5)
CHLORIDE SERPL-SCNC: 92 MMOL/L (ref 98–107)
CLARITY UR: ABNORMAL
CO2 SERPL-SCNC: 29.2 MMOL/L (ref 22–29)
COLOR UR: ABNORMAL
CREAT BLD-MCNC: 1.01 MG/DL (ref 0.57–1)
DEPRECATED RDW RBC AUTO: 42.8 FL (ref 37–54)
EOSINOPHIL # BLD AUTO: 0.11 10*3/MM3 (ref 0–0.4)
EOSINOPHIL NFR BLD AUTO: 1.1 % (ref 0.3–6.2)
ERYTHROCYTE [DISTWIDTH] IN BLOOD BY AUTOMATED COUNT: 13 % (ref 12.3–15.4)
GFR SERPL CREATININE-BSD FRML MDRD: 53 ML/MIN/1.73
GFR SERPL CREATININE-BSD FRML MDRD: 64 ML/MIN/1.73
GLUCOSE BLD-MCNC: 244 MG/DL (ref 65–99)
GLUCOSE UR STRIP-MCNC: NEGATIVE MG/DL
HCT VFR BLD AUTO: 38.9 % (ref 34–46.6)
HGB BLD-MCNC: 12.7 G/DL (ref 12–15.9)
HGB UR QL STRIP.AUTO: NEGATIVE
HYALINE CASTS UR QL AUTO: ABNORMAL /LPF
IMM GRANULOCYTES # BLD AUTO: 0.03 10*3/MM3 (ref 0–0.05)
IMM GRANULOCYTES NFR BLD AUTO: 0.3 % (ref 0–0.5)
KETONES UR QL STRIP: NEGATIVE
LEUKOCYTE ESTERASE UR QL STRIP.AUTO: ABNORMAL
LYMPHOCYTES # BLD AUTO: 1.56 10*3/MM3 (ref 0.7–3.1)
LYMPHOCYTES NFR BLD AUTO: 16 % (ref 19.6–45.3)
MCH RBC QN AUTO: 30 PG (ref 26.6–33)
MCHC RBC AUTO-ENTMCNC: 32.6 G/DL (ref 31.5–35.7)
MCV RBC AUTO: 91.7 FL (ref 79–97)
MONOCYTES # BLD AUTO: 0.71 10*3/MM3 (ref 0.1–0.9)
MONOCYTES NFR BLD AUTO: 7.3 % (ref 5–12)
NEUTROPHILS # BLD AUTO: 7.29 10*3/MM3 (ref 1.7–7)
NEUTROPHILS NFR BLD AUTO: 75 % (ref 42.7–76)
NITRITE UR QL STRIP: POSITIVE
NRBC BLD AUTO-RTO: 0 /100 WBC (ref 0–0.2)
PH UR STRIP.AUTO: 6 [PH] (ref 5–8)
PLATELET # BLD AUTO: 219 10*3/MM3 (ref 140–450)
PMV BLD AUTO: 11.2 FL (ref 6–12)
POTASSIUM BLD-SCNC: 4.3 MMOL/L (ref 3.5–5.2)
PROT UR QL STRIP: NEGATIVE
RBC # BLD AUTO: 4.24 10*6/MM3 (ref 3.77–5.28)
RBC # UR: ABNORMAL /HPF
REF LAB TEST METHOD: ABNORMAL
SODIUM BLD-SCNC: 135 MMOL/L (ref 136–145)
SP GR UR STRIP: <=1.005 (ref 1–1.03)
SQUAMOUS #/AREA URNS HPF: ABNORMAL /HPF
TRANS CELLS #/AREA URNS HPF: ABNORMAL /HPF
UROBILINOGEN UR QL STRIP: ABNORMAL
WBC NRBC COR # BLD: 9.73 10*3/MM3 (ref 3.4–10.8)
WBC UR QL AUTO: ABNORMAL /HPF

## 2020-01-26 PROCEDURE — 85025 COMPLETE CBC W/AUTO DIFF WBC: CPT

## 2020-01-26 PROCEDURE — 81001 URINALYSIS AUTO W/SCOPE: CPT

## 2020-01-26 PROCEDURE — 80048 BASIC METABOLIC PNL TOTAL CA: CPT

## 2020-03-07 ENCOUNTER — LAB REQUISITION (OUTPATIENT)
Dept: LAB | Facility: HOSPITAL | Age: 80
End: 2020-03-07

## 2020-03-07 DIAGNOSIS — Z00.00 ROUTINE GENERAL MEDICAL EXAMINATION AT A HEALTH CARE FACILITY: ICD-10-CM

## 2020-03-07 LAB
ANION GAP SERPL CALCULATED.3IONS-SCNC: 13.7 MMOL/L (ref 5–15)
BUN BLD-MCNC: 11 MG/DL (ref 8–23)
BUN/CREAT SERPL: 12.1 (ref 7–25)
CALCIUM SPEC-SCNC: 9.1 MG/DL (ref 8.6–10.5)
CHLORIDE SERPL-SCNC: 98 MMOL/L (ref 98–107)
CO2 SERPL-SCNC: 23.3 MMOL/L (ref 22–29)
CREAT BLD-MCNC: 0.91 MG/DL (ref 0.57–1)
GFR SERPL CREATININE-BSD FRML MDRD: 60 ML/MIN/1.73
GLUCOSE BLD-MCNC: 252 MG/DL (ref 65–99)
POTASSIUM BLD-SCNC: 4.5 MMOL/L (ref 3.5–5.2)
SODIUM BLD-SCNC: 135 MMOL/L (ref 136–145)

## 2020-03-07 PROCEDURE — 80048 BASIC METABOLIC PNL TOTAL CA: CPT

## 2020-03-14 ENCOUNTER — LAB REQUISITION (OUTPATIENT)
Dept: LAB | Facility: HOSPITAL | Age: 80
End: 2020-03-14

## 2020-03-14 DIAGNOSIS — Z00.00 ROUTINE GENERAL MEDICAL EXAMINATION AT A HEALTH CARE FACILITY: ICD-10-CM

## 2020-03-14 LAB
ANION GAP SERPL CALCULATED.3IONS-SCNC: 15.7 MMOL/L (ref 5–15)
BUN BLD-MCNC: 14 MG/DL (ref 8–23)
BUN/CREAT SERPL: 12.3 (ref 7–25)
CALCIUM SPEC-SCNC: 8.9 MG/DL (ref 8.6–10.5)
CHLORIDE SERPL-SCNC: 92 MMOL/L (ref 98–107)
CO2 SERPL-SCNC: 28.3 MMOL/L (ref 22–29)
CREAT BLD-MCNC: 1.14 MG/DL (ref 0.57–1)
GFR SERPL CREATININE-BSD FRML MDRD: 46 ML/MIN/1.73
GFR SERPL CREATININE-BSD FRML MDRD: 56 ML/MIN/1.73
GLUCOSE BLD-MCNC: 295 MG/DL (ref 65–99)
POTASSIUM BLD-SCNC: 4.3 MMOL/L (ref 3.5–5.2)
SODIUM BLD-SCNC: 136 MMOL/L (ref 136–145)

## 2020-03-14 PROCEDURE — 80048 BASIC METABOLIC PNL TOTAL CA: CPT

## 2020-10-08 ENCOUNTER — LAB REQUISITION (OUTPATIENT)
Dept: LAB | Facility: HOSPITAL | Age: 80
End: 2020-10-08

## 2020-10-08 DIAGNOSIS — Z00.00 ROUTINE GENERAL MEDICAL EXAMINATION AT A HEALTH CARE FACILITY: ICD-10-CM

## 2020-10-08 LAB
ALBUMIN SERPL-MCNC: 3.6 G/DL (ref 3.5–5.2)
ALBUMIN/GLOB SERPL: 1.3 G/DL
ALP SERPL-CCNC: 80 U/L (ref 39–117)
ALT SERPL W P-5'-P-CCNC: 19 U/L (ref 1–33)
ANION GAP SERPL CALCULATED.3IONS-SCNC: 9.9 MMOL/L (ref 5–15)
AST SERPL-CCNC: 23 U/L (ref 1–32)
BASOPHILS # BLD AUTO: 0.02 10*3/MM3 (ref 0–0.2)
BASOPHILS NFR BLD AUTO: 0.3 % (ref 0–1.5)
BILIRUB SERPL-MCNC: 0.5 MG/DL (ref 0–1.2)
BUN SERPL-MCNC: 18 MG/DL (ref 8–23)
BUN/CREAT SERPL: 20.9 (ref 7–25)
CALCIUM SPEC-SCNC: 9.5 MG/DL (ref 8.6–10.5)
CHLORIDE SERPL-SCNC: 96 MMOL/L (ref 98–107)
CO2 SERPL-SCNC: 30.1 MMOL/L (ref 22–29)
CREAT SERPL-MCNC: 0.86 MG/DL (ref 0.57–1)
DEPRECATED RDW RBC AUTO: 43.2 FL (ref 37–54)
EOSINOPHIL # BLD AUTO: 0.26 10*3/MM3 (ref 0–0.4)
EOSINOPHIL NFR BLD AUTO: 3.7 % (ref 0.3–6.2)
ERYTHROCYTE [DISTWIDTH] IN BLOOD BY AUTOMATED COUNT: 13.3 % (ref 12.3–15.4)
GFR SERPL CREATININE-BSD FRML MDRD: 64 ML/MIN/1.73
GFR SERPL CREATININE-BSD FRML MDRD: 77 ML/MIN/1.73
GLOBULIN UR ELPH-MCNC: 2.8 GM/DL
GLUCOSE SERPL-MCNC: 142 MG/DL (ref 65–99)
HCT VFR BLD AUTO: 42.1 % (ref 34–46.6)
HGB BLD-MCNC: 13.9 G/DL (ref 12–15.9)
IMM GRANULOCYTES # BLD AUTO: 0.03 10*3/MM3 (ref 0–0.05)
IMM GRANULOCYTES NFR BLD AUTO: 0.4 % (ref 0–0.5)
LYMPHOCYTES # BLD AUTO: 1.68 10*3/MM3 (ref 0.7–3.1)
LYMPHOCYTES NFR BLD AUTO: 23.9 % (ref 19.6–45.3)
MCH RBC QN AUTO: 29.6 PG (ref 26.6–33)
MCHC RBC AUTO-ENTMCNC: 33 G/DL (ref 31.5–35.7)
MCV RBC AUTO: 89.6 FL (ref 79–97)
MONOCYTES # BLD AUTO: 0.57 10*3/MM3 (ref 0.1–0.9)
MONOCYTES NFR BLD AUTO: 8.1 % (ref 5–12)
NEUTROPHILS NFR BLD AUTO: 4.47 10*3/MM3 (ref 1.7–7)
NEUTROPHILS NFR BLD AUTO: 63.6 % (ref 42.7–76)
NRBC BLD AUTO-RTO: 0 /100 WBC (ref 0–0.2)
PLATELET # BLD AUTO: 187 10*3/MM3 (ref 140–450)
PMV BLD AUTO: 11.1 FL (ref 6–12)
POTASSIUM SERPL-SCNC: 3.4 MMOL/L (ref 3.5–5.2)
PROT SERPL-MCNC: 6.4 G/DL (ref 6–8.5)
RBC # BLD AUTO: 4.7 10*6/MM3 (ref 3.77–5.28)
SODIUM SERPL-SCNC: 136 MMOL/L (ref 136–145)
WBC # BLD AUTO: 7.03 10*3/MM3 (ref 3.4–10.8)

## 2020-10-08 PROCEDURE — 85025 COMPLETE CBC W/AUTO DIFF WBC: CPT

## 2020-10-08 PROCEDURE — 80053 COMPREHEN METABOLIC PANEL: CPT

## 2021-03-02 DIAGNOSIS — Z23 IMMUNIZATION DUE: ICD-10-CM

## 2021-03-31 ENCOUNTER — IMMUNIZATION (OUTPATIENT)
Dept: VACCINE CLINIC | Facility: HOSPITAL | Age: 81
End: 2021-03-31

## 2021-03-31 PROCEDURE — 91300 HC SARSCOV02 VAC 30MCG/0.3ML IM: CPT | Performed by: INTERNAL MEDICINE

## 2021-03-31 PROCEDURE — 0001A: CPT | Performed by: INTERNAL MEDICINE
